# Patient Record
(demographics unavailable — no encounter records)

---

## 2025-05-16 NOTE — DISCUSSION/SUMMARY
[FreeTextEntry1] : 58 YEAR OLD FEMALE LMP 2017 PRESENTS FOR WELL WOMAN GYNECOLOGIC EXAMINATION AND PAP .LAST SEEN FOR WELL WOMAN VISIT 5/22/2024; PAP AND HPV HR TESTING DONE AT THAT TIME WERE BOTH NEGATIVE. NO NEW MEDICAL OR SURGICAL HISTORY BUT JUST RECENTLY NOTED A VERY SMALL "BLOOD CLOT": WHEN URINATING 3 DAYS AGO. NOTHING SINCE. MEDICAITNS;INFLECTA                           INJECTION FOR COLITIS                          IBERSARTAN FOR HYPERTENSION                          HCTZ 25 FOR HYPERTENSION MEDICATION ALLERGIES; NONE ROS;BMS-NORMAL; NO FAM HISTORY OF COLON CANCER; LAST COLONOSCOPY < 2 YRS.           NO URINARY COMPLAINTS          NOT SEXUALLY ACTIVE BREASTS- DOES NOT DO BREAST SELF EXAMINATION                    NO FAM HISTORY OF BREAST CANCER + WALKS; + MULTIVITMAINS; NO CALCIUM SUPPLEMENT; + CHEESE/YOGURT; NO TOBACCO/VAPE LAST BONE DENSITY TESTING DONE 2/7/2023 WITH LOSS, OSTEOPOROSIS AT LS WITH T -2.8                     AND LOSS, OSTEOPENIA AT  HIP WITH T SCORE -2.0 FEMORAL NECK.  PE ; /74; TEMP 97.8;WEIGHT 134 LBS HEIGHT 5'0"        BREASTS; NO MASSES OR DISCHARGES        ABDOMEN;SOFT, NO MASSES; NO TENDERNESS TO PALPATION        PELVIC NORMAL EXTERNAL GENITALIA                     NORMAL URETHRAL MEATUS                     NORMAL VAGINA WITHOUT LESION                     NORMAL CERVIX WITH SMALL APPROXIMATLEY 4 MM POLYPOID LESION IN OS                     ANTEVERTE DNORMAL UTERUS ON BIMANUAL EXAMINATION                     NO PALPABLE ADNEXAL MASSES  IMP; WELL WOMAN          MENOPAUSE           CERVICAL POLYP           OSTEOPOROSIS          ULCERATIVE COLITIS          HYPERTENSION  PLAN; THIN PREP PAP WITH HR  HPV TESTING DONE-PT TO CALL IN 2 WKS FOR RESULTS            BREAST SELF EXAMINATION MONTHLY CONTINUED  TO BE ENCOURAGED            SCRIPT GIVEN FOR BILATERAL DX MAMMOGPRAHYH FOR 8/2025            BONE DENSITY TESTING ADVISED AND TO BE SCHEDULED            PELVIC US ADVISED.

## 2025-07-08 NOTE — PLAN
[FreeTextEntry1] : 58 YEAR OLD FEMALE  STATUS POST HYSTEROSCOPY D AND C FOR THICKENED ENDOMETRIUM AND ENDOMETRIAL POLYP 6/20/2025 PRESENTS FOR POST OPERATIVE APPT. PT HAD SOME LIGHT BLEEDING AND CRAMPING POST OPERATIVELY FOR ABOUT ONE WEEK BUT HAS BEEN FINE SINCE . NOT EXCERCISING YET AND NOT SEXUALLY ACTIVE YET. PATHOLOGY CONFIRMS BENIGN ENDOMETRIAL POLYP.  PE;/70 ; TEMP 97.9 WEIGHT 135 LBS HEIGHT 5'     ABDOMEN SOFT, NO MASSES; NO TENDERNESS TO PALPATION     PELVIC NORMAL EXTERNAL GENITALIA                   NORMAL URETHRAL MEATUS                   NORMAL VAGINA WITHOUT LESION                   ANTEVERTED UTERUS ON BIMANUAL EXAMINATION                   NO PALPABLE ADNEXAL MASSES  IMP; STATUS POST HYSTEROSCOPY D AND C DOING WELL  PLAN; MAY RESUME ALL ACTIVITIES.             DISCUSSED SURGERY, FINDINGS AND PATHOLOGY REPORT WITH PT .                     ALL QUESTIONS ANSWERED. PT GIVEN COPIES OF PATHOLOGY RPORT             RETURN TO OFFICE ONE YEAR   ( 5/2026) FOR WELL WOMAN VISIT AND PAP OR RTO PRN

## 2025-07-29 NOTE — REASON FOR VISIT
[Home] : at home, [unfilled] , at the time of the visit. [Medical Office: ([facility])___] : at the medical office located in  [Telehealth (audio & video)] : This visit was provided via telehealth using real-time 2-way audio visual technology. [Verbal consent obtained from patient] : the patient, [unfilled] [Initial] : an initial visit [Sleep Evaluation] : sleep evaluation

## 2025-07-29 NOTE — HISTORY OF PRESENT ILLNESS
[TextBox_4] : - Summary : Elvira presents for sleep evaluation due to concerns about her sleep quality and potential sleep apnea. - Chief Complaint (CC) : Sleep disturbances, including snoring and possible sleep apnea - History of Present Illness : Elvira, a female patient, presents for a sleep evaluation. She reports experiencing sleep disturbances, including snoring and possible sleep apnea. The patient mentions waking up in the middle of the night with a dry mouth. She also reports feeling tired upon waking, indicating poor sleep quality. Elvira denies frequent nighttime urination. There is no mention of gagging or choking episodes during sleep, but the provider suspects these may be occurring based on the overall clinical picture.